# Patient Record
Sex: MALE | Race: BLACK OR AFRICAN AMERICAN | NOT HISPANIC OR LATINO | URBAN - METROPOLITAN AREA
[De-identification: names, ages, dates, MRNs, and addresses within clinical notes are randomized per-mention and may not be internally consistent; named-entity substitution may affect disease eponyms.]

---

## 2023-10-07 ENCOUNTER — HOSPITAL ENCOUNTER (EMERGENCY)
Facility: HOSPITAL | Age: 38
Discharge: HOME | End: 2023-10-07
Attending: EMERGENCY MEDICINE
Payer: COMMERCIAL

## 2023-10-07 VITALS
TEMPERATURE: 98.4 F | WEIGHT: 173 LBS | HEIGHT: 72 IN | OXYGEN SATURATION: 98 % | RESPIRATION RATE: 18 BRPM | BODY MASS INDEX: 23.43 KG/M2 | SYSTOLIC BLOOD PRESSURE: 138 MMHG | HEART RATE: 86 BPM | DIASTOLIC BLOOD PRESSURE: 97 MMHG

## 2023-10-07 DIAGNOSIS — R21 RASH: Primary | ICD-10-CM

## 2023-10-07 PROCEDURE — 99282 EMERGENCY DEPT VISIT SF MDM: CPT

## 2023-10-07 PROCEDURE — 99281 EMR DPT VST MAYX REQ PHY/QHP: CPT

## 2023-10-07 PROCEDURE — 99283 EMERGENCY DEPT VISIT LOW MDM: CPT | Performed by: EMERGENCY MEDICINE

## 2023-10-07 ASSESSMENT — PAIN SCALES - GENERAL: PAINLEVEL_OUTOF10: 10 - WORST POSSIBLE PAIN

## 2023-10-07 ASSESSMENT — PAIN DESCRIPTION - LOCATION: LOCATION: HAND

## 2023-10-07 ASSESSMENT — PAIN - FUNCTIONAL ASSESSMENT: PAIN_FUNCTIONAL_ASSESSMENT: 0-10

## 2023-10-07 ASSESSMENT — PAIN DESCRIPTION - PAIN TYPE: TYPE: ACUTE PAIN

## 2023-10-07 NOTE — Clinical Note
Lizzy Prather was seen and treated in our emergency department on 10/7/2023.  He may return to work on 10/14/2023.       If you have any questions or concerns, please don't hesitate to call.      Edyta Hardy PA-C

## 2023-10-07 NOTE — ED PROVIDER NOTES
HPI   Chief Complaint   Patient presents with    Rash     Hands and feet.       HISTORY OF PRESENT ILLNESS:  38 year old male presenting to the ED with complaint of a rash on the bilateral hands and feet.  Has been present for 3 to 4 days.  He states that he initially had some itching in one of his armpits and his back, was seen at an urgent care at that time, prescribed a topical lotion, states that the itching in his armpits and back has resolved, has not had a rash on the armpits or back.  He states that about 4 days ago he developed a painful rash on the palms of the hands and the soles of the feet, he has significant pain with ambulation, states that he has had to take off work for the past 4 days due to the painful rash.  He has been seen twice at urgent care and by his PCP, per chart review he was diagnosed with likely hand-foot-and-mouth disease.  He has been taking Zyrtec, Motrin, and using a topical steroid which do provide some relief.  He denies any mouth pain or lesions within the mouth.  Denies fevers or chills.  He denies any bleeding or drainage from the rash.  He denies any high risk sexual activity, he states that he has been sexually active with his wife only for 10+ years, has no history of syphilis.  He was given referral to dermatology, but has not yet followed up.  He denies any other complaints.  States that he needs a work note due to missing work from his symptoms.     PMH: denies  Family history: noncontributory  Social history: non smoker, no ETOH, no illicit substances    REVIEW OF SYSTEMS:    General: Denies fever, chills, malaise, confusion, decreased appetite or fluids  Respiratory:  Denies cough, shortness of breath  Cardiac:  Denies chest pain, palpitations  Gastrointestinal:  Denies abdominal pain, nausea, vomiting, diarrhea  Musculoskeletal:  Denies neck pain, back pain, swelling, weakness  Neurological:  Denies dizziness, headache, numbness, tingling, syncope  Eyes:  Denies  blurry vision, vision loss or changes  ENMT:  Denies nasal congestion, ear pain, nosebleed, sore throat  Skin: +hand and foot rash. Denies bruising or bleeding  Genitourinary:  Denies dysuria, flank pain, frequency, hematuria         History provided by:  Patient and spouse                      No data recorded                Patient History   History reviewed. No pertinent past medical history.  History reviewed. No pertinent surgical history.  No family history on file.  Social History     Tobacco Use    Smoking status: Not on file    Smokeless tobacco: Not on file   Substance Use Topics    Alcohol use: Not on file    Drug use: Not on file       Physical Exam   ED Triage Vitals [10/07/23 1225]   Temp Heart Rate Resp BP   36.9 °C (98.4 °F) 86 18 (!) 138/97      SpO2 Temp Source Heart Rate Source Patient Position   98 % Tympanic -- Sitting      BP Location FiO2 (%)     Left arm --       Physical Exam  Constitutional:       General: He is not in acute distress.     Appearance: He is not toxic-appearing.   HENT:      Head: Normocephalic and atraumatic.      Nose: No congestion.      Mouth/Throat:      Mouth: Mucous membranes are moist.   Eyes:      General: No scleral icterus.     Extraocular Movements: Extraocular movements intact.      Pupils: Pupils are equal, round, and reactive to light.   Cardiovascular:      Rate and Rhythm: Normal rate and regular rhythm.      Pulses: Normal pulses.   Pulmonary:      Effort: Pulmonary effort is normal. No respiratory distress.   Abdominal:      General: There is no distension.      Palpations: Abdomen is soft.      Tenderness: There is no abdominal tenderness. There is no guarding.   Musculoskeletal:         General: Normal range of motion.      Cervical back: Normal range of motion and neck supple. No rigidity.      Right lower leg: No edema.      Left lower leg: No edema.   Skin:     General: Skin is warm and dry.      Capillary Refill: Capillary refill takes less than 2  seconds.      Comments: + Mildly erythematous, vesicular, maculopapular rash on the bilateral palms of the hands, into the wrists, and the bottoms of the feet.  Some of the lesions have central necrosis.  No open ulcers or sores, no breaks in skin, no bleeding or drainage.  No overt fluctuance or induration.  No significant surrounding erythema or warmth.  No evidence of cellulitis.  No sloughing of skin.  No edema or decreased range of motion of the joints.   Neurological:      General: No focal deficit present.      Mental Status: He is alert and oriented to person, place, and time.      Gait: Gait normal.   Psychiatric:         Mood and Affect: Mood normal.         Behavior: Behavior normal.         Judgment: Judgment normal.         ED Course & MDM   Diagnoses as of 10/07/23 1417   Rash       Medical Decision Making  ED course / MDM     Summary:  Patient presented with a rash on the bilateral palms and soles. Painful, not itchy. No other symptoms. Has been seen three times previously for this, diagnosed with likely hand-foot-and-mouth disease.  Vital signs are stable, patient appears nontoxic.  On exam, there are scattered vesicular maculopapular rash on the hands and feet, some lesions have central necrosis, there is no significant erythema or warmth, no fluctuance, no induration, no evidence of cellulitis or abscess.  No sloughing of skin.  Per chart review, patient had a fever of 100.5 and oral lesions at a previous visit. Patient case discussed with ED attending Dr. Villegas, who also saw and evaluated the patient. Results and differential were discussed in detail with the patient. He has no other symptoms and is otherwise healthy.  Symptoms do appear most consistent with hand-foot-and-mouth disease.  He states that he returned today because he needs a work note, and he was unaware that he has been given referral to dermatology.  This was provided today, and he was given a new referral to dermatology.  He  will continue with his triamcinolone, Zyrtec, and Tylenol and Motrin as needed.  Discussed supportive care instructions at length.  He will also follow-up with his PCP. Patient was given strict return precautions, understands reasons to return to the ED. Also discussed supportive care instructions. I expressed the importance of outpatient follow up with their PCP. All questions were answered, patient expressed understanding and stated that they would comply.    Patient was advised to follow up with PCP or recommended provider in 2-3 days for another evaluation and exam. I advised patient and family/friend/caregiver/guardian to return or go to closest emergency room immediately if symptoms change, get worse, new symptoms develop prior to follow up. If there is no improvement in symptoms in the next 24 hours they are advised to return for further evaluation and exam. I also explained the plan and treatment course. Patient and family/friend/caregiver/guardian is in agreement with plan, treatment course, and follow up and states verbally that they will comply.    Impression:  1. See diagnosis    Plan: Homegoing. I discussed the differential, results, and discharge plan with the patient and family/friend/caregiver. I emphasized the importance of follow-up with the physician I referred them to in the timeframe recommended.  I explained reasons for the patient to return to the Emergency Department. They agreed that if they feel their condition is worsening or if they have any other concern they should call 911 immediately for further assistance. We also discussed medications that were prescribed including common side effects and interactions. The patient was advised to abstain from driving, operating heavy machinery, or making significant decisions while taking medications such as opiates and muscle relaxers that may impair this. I gave the patient an opportunity to ask all questions they had and answered all of them  accordingly. They understand return precautions and discharge instructions. The patient and family/friend/caregiver expressed understanding verbally and that they would comply.       Disposition: Discharge    Patient seen and discussed with Dr. Villegas    This note has been transcribed using voice recognition and may contain grammatical errors, misplaced words, incorrect words, incorrect phrases or other errors.         Procedure  Procedures     Edyta Hardy PA-C  10/07/23 1657    Pruritus to the left axilla where the mild associated rash.  He was seen by his doctor and started on Zyrtec and triamcinolone.  Since then that has since resolved but he developed painful vesicular rash over bilateral hands and a little bit on the soles of his feet.  He denies any other symptoms including fevers, chills, nausea, vomiting, diarrhea.  He is fully vaccinated and denies any sick contacts.  He has not traveled abroad.  He has not been bitten by any bugs or handled any new plants.  He denies any history of STIs, new sexual contact, dysuria, hematuria or urethral discharge.  He has been seen multiple times for this and been told that he has hand-foot-and-mouth but the pain persist so he is presenting for evaluation once again.  On evaluation patient is actually very well-appearing and in no acute distress.  Lungs are clear heart is regular.  He does have vesicular-like lesions over bilateral hands and to some small degree the soles of the feet particularly the right 1.  Presentation is consistent with hand-foot-and-mouth as described above.  Patient has been referred to dermatology but is yet to make an appointment.  A referral was given once again.  Given lack of other history, risk factors low suspicion for other infections.  Had extensive discussion with patient regarding allowing time to heal and that is a virus it has to run its course.  Patient is to alternate Tylenol and ibuprofen at home for pain or discomfort.  He  was educated on supportive care at home as well as signs and symptoms that should prompt immediate turn to emergency room.  He will follow-up with PCP and dermatology.    Diagnosis:  Disposition:    Susan Villegas MD  [facility]   Emergency Department     I have personally performed and/or participated in all of the above services and procedures. I have reviewed all the nurses' notes and have confirmed their findings, and have incorporated those findings into this medical record.   I have reviewed the resident history and physician finding; as well as the treatment. On my own examination I agree and incorporated in this document my own history, examination findings and clinical decision making.  All notation in this Addendum supersedes information presented by the resident or NITESH as listed above.       Susan Steele MD  10/07/23 0445

## 2023-10-07 NOTE — Clinical Note
Lizzy Prather was seen and treated in our emergency department on 10/7/2023.  He may return to work on 10/11/2023.       If you have any questions or concerns, please don't hesitate to call.      Edyta Hardy PA-C